# Patient Record
Sex: FEMALE | Race: WHITE | NOT HISPANIC OR LATINO | Employment: OTHER | URBAN - METROPOLITAN AREA
[De-identification: names, ages, dates, MRNs, and addresses within clinical notes are randomized per-mention and may not be internally consistent; named-entity substitution may affect disease eponyms.]

---

## 2017-01-03 ENCOUNTER — THERAPY VISIT (OUTPATIENT)
Dept: PHYSICAL THERAPY | Facility: CLINIC | Age: 82
End: 2017-01-03
Payer: MEDICARE

## 2017-01-03 DIAGNOSIS — G89.29 CHRONIC BILATERAL LOW BACK PAIN WITH LEFT-SIDED SCIATICA: Primary | ICD-10-CM

## 2017-01-03 DIAGNOSIS — M70.62 TROCHANTERIC BURSITIS OF LEFT HIP: ICD-10-CM

## 2017-01-03 DIAGNOSIS — M54.42 CHRONIC BILATERAL LOW BACK PAIN WITH LEFT-SIDED SCIATICA: Primary | ICD-10-CM

## 2017-01-03 DIAGNOSIS — M51.369 DDD (DEGENERATIVE DISC DISEASE), LUMBAR: ICD-10-CM

## 2017-01-03 DIAGNOSIS — M48.061 SPINAL STENOSIS OF LUMBAR REGION WITHOUT NEUROGENIC CLAUDICATION: ICD-10-CM

## 2017-01-03 PROCEDURE — G8978 MOBILITY CURRENT STATUS: HCPCS | Mod: GP | Performed by: PHYSICAL THERAPIST

## 2017-01-03 PROCEDURE — 97110 THERAPEUTIC EXERCISES: CPT | Mod: GP | Performed by: PHYSICAL THERAPIST

## 2017-01-03 PROCEDURE — 97162 PT EVAL MOD COMPLEX 30 MIN: CPT | Mod: GP | Performed by: PHYSICAL THERAPIST

## 2017-01-03 PROCEDURE — 97112 NEUROMUSCULAR REEDUCATION: CPT | Mod: GP | Performed by: PHYSICAL THERAPIST

## 2017-01-03 PROCEDURE — G8979 MOBILITY GOAL STATUS: HCPCS | Mod: GP | Performed by: PHYSICAL THERAPIST

## 2017-01-03 NOTE — LETTER
DEPARTMENT OF HEALTH AND HUMAN SERVICES  CENTERS FOR MEDICARE & MEDICAID SERVICES    PLAN/UPDATED PLAN OF PROGRESS FOR OUTPATIENT REHABILITATION    PATIENTS NAME:  Henry España   : 1929    PROVIDER NUMBER:    [id number]    [facility]  [id number]     PROVIDER NAME: [facility] [facility] - [facility] PHYSICAL [facility]    MEDICAL RECORD NUMBER: [id number]     START OF CARE DATE:  SOC Date: 17   TYPE:  PT    PRIMARY/TREATMENT DIAGNOSIS: (Pertinent Medical Diagnosis)     Chronic bilateral low back pain with left-sided sciatica  Spinal stenosis of lumbar region without neurogenic claudication  DDD (degenerative disc disease), lumbar  Trochanteric bursitis of left hip    VISITS FROM START OF CARE:  Rxs Used: 1     [place] [facility] Initial Evaluation    Subjective:  Henry Albrecht is a 88 year old female with a lumbar condition.  Condition occurred with:  Degenerative joint disease.  Condition occurred: other.  This is a chronic condition  Patient reports that she has had lower back pain for the last 8-10 years. She has had 2 surgeries to the lower back with the most recent in  which did help for a short period of time. She started getting left leg pain not long after that surgery. She also has severe DJD of R knee and they do not want to do a replacement because of her age. She had an injection to the R knee on 16 with no change and injection to the L hip 16 for trochanteric bursitis with no change noted. .    Patient reports pain:  Lumbar spine left, central lumbar spine and lumbar spine right (L>>R).  Radiates to:  Gluteals left, thigh left, lower leg left, knee right and thigh right (lateral left thigh and lower leg, lateral right lateral lower leg).  Pain is described as sharp and aching and is constant and reported as 7/10 (up to 8-9/10 when bad).   Pain is the same all the time.  Symptoms are exacerbated by standing, walking and lifting (stand sage 10'  with 8-9/10 PL, walk sage 5-10' with a walker and 7/10 PL) and relieved by heat and other (sitting, Gabapentin).  Since onset symptoms are unchanged.  Special testing: severe lumbar DDD and DJD with multilevel stenosis and lg synovial cyst L L3-4 which appears to displace L L4 nn root       General health as reported by patient is fair.  Pertinent medical history includes:  Rheumatoid arthritis, cancer, high blood pressure, stroke, kidney disease and thyroid problems (skin cancer, stork in , has only one kidney).  Medical allergies: no.  Other surgeries include:  Orthopedic surgery ( and  to lumbar spine).  Current medications:  High blood pressure medication, thyroid   PATIENTS NAME:  Henry España   : 1929      medication and pain medication.  Current occupation is retired.      Barriers include:  Transportation.  Red flags:  None as reported by the patient.                  Objective:       Lumbar/SI Evaluation   AROM Lumbar:   Flexion:          Fingertips to mid lower leg and no pain  Ext:                    Sign loss and increase pain   Side Bend:        Left:     Right:   Rotation:           Left:     Right:   Side Glide:        Left:  Mod loss and ache    Right:  Mod loss and ache  Lumbar Myotomes:  normal  Lumbar DTR's:  normal  Cord Signs:  normal  Lumbar Dermtomes:  not assessed  Neural Tension/Mobility:  Lumbar:  Normal    Lumbar Palpation:    Tenderness present at Left:    Quadratus Lumborum and Erector Spinae  Tenderness present at Right: Erector Spinae    Magen Lumbar Evaluation  Posture:  Sitting: poor  Standing: fair    Lateral Shift: no  Correction of Posture: no effect    Test Movements:  FIS: During: no effect  After: no effect    Repeat FIS: During: no effect  After: no effect    EIS: During: increases  After: no worse    Repeat EIS: During: increases  After: worse    KARINA: During: no effect  After: no effect    Repeat KARINA: During: decreases  After: better       Assessment/Plan:    Patient is a 88 year old female with lumbar complaints.    Patient has the following significant findings with corresponding treatment plan.                Diagnosis 1:  Lumbar stenosis/sign DDD,   Pain -  hot/cold therapy, electric stimulation, mechanical traction, manual therapy, self management, education, directional preference exercise and home program  Decreased ROM/flexibility - manual therapy, therapeutic exercise and home program  Decreased joint mobility - manual therapy, therapeutic exercise and home program  Decreased strength - therapeutic exercise, therapeutic activities and home program  PATIENTS NAME:  Henry España   : 1929      Impaired balance - neuro re-education, therapeutic activities and home program  Impaired muscle performance - neuro re-education and home program  Decreased function - therapeutic activities and home program  Impaired posture - neuro re-education and home program    Therapy Evaluation Codes:   1) History comprised of:   Personal factors that impact the plan of care:      Age.    Comorbidity factors that impact the plan of care are:      Osteoarthritis.     Medications impacting care: None.  2) Examination of Body Systems comprised of:   Body structures and functions that impact the plan of care:      Hip, Knee and Lumbar spine.   Activity limitations that impact the plan of care are:      Cooking, Driving, Lifting, Standing and Walking.   Clinical presentation characteristics are:    Evolving/Changing.  3) Presentation comprised of:   Presentation scored as Moderate complexity with Evolving presentation with changing characteristics..  4) Decision-Making    Moderate complexity using standardized patient assessment instrument and/or measureable assessment of functional outcome.  Cumulative Therapy Evaluation is: Moderate complexity.    Previous and current functional limitations:  (See Goal Flow Sheet for this information)    Short term  "and Long term goals: (See Goal Flow Sheet for this information)     Communication ability:  Patient appears to be able to clearly communicate and understand verbal and written communication and follow directions correctly.  Treatment Explanation - The following has been discussed with the patient:   RX ordered/plan of care  Anticipated outcomes  Possible risks and side effects  This patient would benefit from PT intervention to resume normal activities.   Rehab potential is good.    Frequency:  2 X week, once daily  Duration:  for 2 weeks and progressing to 1x week for 2 weeks  Discharge Plan:  Achieve all LTG.  Independent in home treatment program.  Reach maximal therapeutic benefit.       PATIENTS NAME:  Henry España   : 1929        Caregiver Signature/Credentials _____________________________ Date ________       Treating Provider: Joanna Jacobsen PT   I have reviewed and certified the need for these services and plan of treatment while under my care.        PHYSICIAN'S SIGNATURE:   _________________________________________  Date___________   Adolph Colon    Certification period:  Beginning of Cert date period: 17 to  End of Cert period date: 17     Functional Level Progress Report: Please see attached \"Goal Flow sheet for Functional level.\"    ____X____ Continue Services or       ________ DC Services                Service dates: From  SOC Date: 17 date to present                         "

## 2017-01-03 NOTE — PROGRESS NOTES
[place] [facility] Initial Evaluation  Subjective:    Henry Albrecht is a 88 year old female with a lumbar condition.  Condition occurred with:  Degenerative joint disease.  Condition occurred: other.  This is a chronic condition  Patient reports that she has had lower back pain for the last 8-10 years. She has had 2 surgeries to the lower back with the most recent in 2015 which did help for a short period of time. She started getting left leg pain not long after that surgery. She also has severe DJD of R knee and they do not want to do a replacement because of her age. She had an injection to the R knee on 12-20-16 with no change and injection to the L hip 12-21-16 for trochanteric bursitis with no change noted. .    Patient reports pain:  Lumbar spine left, central lumbar spine and lumbar spine right (L>>R).  Radiates to:  Gluteals left, thigh left, lower leg left, knee right and thigh right (lateral left thigh and lower leg, lateral right lateral lower leg).  Pain is described as sharp and aching and is constant and reported as 7/10 (up to 8-9/10 when bad).   Pain is the same all the time.  Symptoms are exacerbated by standing, walking and lifting (stand sage 10' with 8-9/10 PL, walk sage 5-10' with a walker and 7/10 PL) and relieved by heat and other (sitting, Gabapentin).  Since onset symptoms are unchanged.  Special testing: severe lumbar DDD and DJD with multilevel stenosis and lg synovial cyst L L3-4 which appears to displace L L4 nn root       General health as reported by patient is fair.  Pertinent medical history includes:  Rheumatoid arthritis, cancer, high blood pressure, stroke, kidney disease and thyroid problems (skin cancer, stork in 1998, has only one kidney).  Medical allergies: no.  Other surgeries include:  Orthopedic surgery (2011 and 2015 to lumbar spine).  Current medications:  High blood pressure medication, thyroid medication and pain medication.  Current occupation is  retired.        Barriers include:  Transportation.    Red flags:  None as reported by the patient.                      Objective:    System         Lumbar/SI Evaluation  ROM:    AROM Lumbar:   Flexion:          Fingertips to mid lower leg and no pain  Ext:                    Sign loss and increase pain   Side Bend:        Left:     Right:   Rotation:           Left:     Right:   Side Glide:        Left:  Mod loss and ache    Right:  Mod loss and ache          Lumbar Myotomes:  normal            Lumbar DTR's:  normal      Cord Signs:  normal    Lumbar Dermtomes:  not assessed                Neural Tension/Mobility:  Lumbar:  Normal        Lumbar Palpation:    Tenderness present at Left:    Quadratus Lumborum and Erector Spinae  Tenderness present at Right: Erector Spinae                                                       Magen Lumbar Evaluation    Posture:  Sitting: poor  Standing: fair    Lateral Shift: no  Correction of Posture: no effect      Test Movements:  FIS: During: no effect  After: no effect    Repeat FIS: During: no effect  After: no effect    EIS: During: increases  After: no worse    Repeat EIS: During: increases  After: worse    KARINA: During: no effect  After: no effect    Repeat KARINA: During: decreases  After: better                                                     ROS    Assessment/Plan:      Patient is a 88 year old female with lumbar complaints.    Patient has the following significant findings with corresponding treatment plan.                Diagnosis 1:  Lumbar stenosis/sign DDD,   Pain -  hot/cold therapy, electric stimulation, mechanical traction, manual therapy, self management, education, directional preference exercise and home program  Decreased ROM/flexibility - manual therapy, therapeutic exercise and home program  Decreased joint mobility - manual therapy, therapeutic exercise and home program  Decreased strength - therapeutic exercise, therapeutic activities and home  program  Impaired balance - neuro re-education, therapeutic activities and home program  Impaired muscle performance - neuro re-education and home program  Decreased function - therapeutic activities and home program  Impaired posture - neuro re-education and home program    Therapy Evaluation Codes:   1) History comprised of:   Personal factors that impact the plan of care:      Age.    Comorbidity factors that impact the plan of care are:      Osteoarthritis.     Medications impacting care: None.  2) Examination of Body Systems comprised of:   Body structures and functions that impact the plan of care:      Hip, Knee and Lumbar spine.   Activity limitations that impact the plan of care are:      Cooking, Driving, Lifting, Standing and Walking.   Clinical presentation characteristics are:    Evolving/Changing.  3) Presentation comprised of:   Presentation scored as Moderate complexity with Evolving presentation with changing characteristics..  4) Decision-Making    Moderate complexity using standardized patient assessment instrument and/or measureable assessment of functional outcome.  Cumulative Therapy Evaluation is: Moderate complexity.    Previous and current functional limitations:  (See Goal Flow Sheet for this information)    Short term and Long term goals: (See Goal Flow Sheet for this information)     Communication ability:  Patient appears to be able to clearly communicate and understand verbal and written communication and follow directions correctly.  Treatment Explanation - The following has been discussed with the patient:   RX ordered/plan of care  Anticipated outcomes  Possible risks and side effects  This patient would benefit from PT intervention to resume normal activities.   Rehab potential is good.    Frequency:  2 X week, once daily  Duration:  for 2 weeks and progressing to 1x week for 2 weeks  Discharge Plan:  Achieve all LTG.  Independent in home treatment program.  Reach maximal  therapeutic benefit.    Please refer to the daily flowsheet for treatment today, total treatment time and time spent performing 1:1 timed codes.

## 2017-01-05 ENCOUNTER — THERAPY VISIT (OUTPATIENT)
Dept: PHYSICAL THERAPY | Facility: CLINIC | Age: 82
End: 2017-01-05
Payer: MEDICARE

## 2017-01-05 DIAGNOSIS — M54.42 CHRONIC BILATERAL LOW BACK PAIN WITH LEFT-SIDED SCIATICA: Primary | ICD-10-CM

## 2017-01-05 DIAGNOSIS — M70.62 TROCHANTERIC BURSITIS OF LEFT HIP: ICD-10-CM

## 2017-01-05 DIAGNOSIS — G89.29 CHRONIC BILATERAL LOW BACK PAIN WITH LEFT-SIDED SCIATICA: Primary | ICD-10-CM

## 2017-01-05 PROCEDURE — 97110 THERAPEUTIC EXERCISES: CPT | Mod: GP | Performed by: PHYSICAL THERAPIST

## 2017-01-05 PROCEDURE — 97112 NEUROMUSCULAR REEDUCATION: CPT | Mod: GP | Performed by: PHYSICAL THERAPIST

## 2017-01-10 ENCOUNTER — THERAPY VISIT (OUTPATIENT)
Dept: PHYSICAL THERAPY | Facility: CLINIC | Age: 82
End: 2017-01-10
Payer: MEDICARE

## 2017-01-10 DIAGNOSIS — G89.29 CHRONIC BILATERAL LOW BACK PAIN WITH LEFT-SIDED SCIATICA: ICD-10-CM

## 2017-01-10 DIAGNOSIS — M54.42 CHRONIC BILATERAL LOW BACK PAIN WITH LEFT-SIDED SCIATICA: ICD-10-CM

## 2017-01-10 DIAGNOSIS — M70.62 TROCHANTERIC BURSITIS OF LEFT HIP: Primary | ICD-10-CM

## 2017-01-10 PROCEDURE — 97112 NEUROMUSCULAR REEDUCATION: CPT | Mod: GP | Performed by: PHYSICAL THERAPIST

## 2017-01-10 PROCEDURE — 97110 THERAPEUTIC EXERCISES: CPT | Mod: GP | Performed by: PHYSICAL THERAPIST

## 2017-01-10 NOTE — PROGRESS NOTES
Subjective:    HPI                    Objective:    System    Physical Exam    General     ROS    Assessment/Plan:      SUBJECTIVE  Subjective changes as noted by pt:  Patient reports that she is feeling some better. She still gets left leg pain with standing and walking. The sitting flexion stretch does help decrease her pain.      Current Pain level: 3/10   Changes in function:  Yes (See Goal flowsheet attached for changes in current functional level)     Adverse reaction to treatment or activity:  None    OBJECTIVE  Changes in objective findings:  Pain produced yet with BB and improved with FB. Patient becoming more independent with HEP and endurance improved as well.         ASSESSMENT  Henry continues to require intervention to meet STG and LTG's: PT  Patient is progressing as expected.  Response to therapy has shown an improvement in  pain level and function  Progress made towards STG/LTG?  Yes (See Goal flowsheet attached for updates  achievement of STG and LTG)    PLAN  Current treatment program is being advanced to more complex exercises.    PTA/ATC plan:  N/A    Please refer to the daily flowsheet for treatment today, total treatment time and time spent performing 1:1 timed codes.

## 2017-01-12 ENCOUNTER — THERAPY VISIT (OUTPATIENT)
Dept: PHYSICAL THERAPY | Facility: CLINIC | Age: 82
End: 2017-01-12
Payer: MEDICARE

## 2017-01-12 DIAGNOSIS — M70.62 TROCHANTERIC BURSITIS OF LEFT HIP: Primary | ICD-10-CM

## 2017-01-12 DIAGNOSIS — M54.42 CHRONIC BILATERAL LOW BACK PAIN WITH LEFT-SIDED SCIATICA: ICD-10-CM

## 2017-01-12 DIAGNOSIS — G89.29 CHRONIC BILATERAL LOW BACK PAIN WITH LEFT-SIDED SCIATICA: ICD-10-CM

## 2017-01-12 PROCEDURE — 97110 THERAPEUTIC EXERCISES: CPT | Mod: GP | Performed by: PHYSICAL THERAPIST

## 2017-01-12 PROCEDURE — 97140 MANUAL THERAPY 1/> REGIONS: CPT | Mod: GP | Performed by: PHYSICAL THERAPIST

## 2017-01-17 ENCOUNTER — THERAPY VISIT (OUTPATIENT)
Dept: PHYSICAL THERAPY | Facility: CLINIC | Age: 82
End: 2017-01-17
Payer: MEDICARE

## 2017-01-17 DIAGNOSIS — G89.29 CHRONIC BILATERAL LOW BACK PAIN WITH LEFT-SIDED SCIATICA: ICD-10-CM

## 2017-01-17 DIAGNOSIS — M54.42 CHRONIC BILATERAL LOW BACK PAIN WITH LEFT-SIDED SCIATICA: ICD-10-CM

## 2017-01-17 DIAGNOSIS — M70.62 TROCHANTERIC BURSITIS OF LEFT HIP: Primary | ICD-10-CM

## 2017-01-17 PROCEDURE — 97035 APP MDLTY 1+ULTRASOUND EA 15: CPT | Mod: GP | Performed by: PHYSICAL THERAPIST

## 2017-01-17 PROCEDURE — 97110 THERAPEUTIC EXERCISES: CPT | Mod: GP | Performed by: PHYSICAL THERAPIST

## 2017-01-17 NOTE — PROGRESS NOTES
Subjective:    HPI                    Objective:    System    Physical Exam    General     ROS    Assessment/Plan:      SUBJECTIVE  Subjective changes as noted by pt:  Patient reports that she is about 40-50% better since starting therapy. She had a bad day yesterday, but she thinks it was from the weather. She overall, notes less leg pain in the morning and less pain overall.    Current Pain level: 3/10   Changes in function:  Yes (See Goal flowsheet attached for changes in current functional level)     Adverse reaction to treatment or activity:  None    OBJECTIVE  Changes in objective findings:  Standing LROM: extension still significantly limited with onset of left leg pain. She is now able to perform HEP with minimal verbal cues needed.         ASSESSMENT  Henry continues to require intervention to meet STG and LTG's: PT  Patient's symptoms are resolving.  Patient is progressing as expected.  Response to therapy has shown an improvement in  pain level, radicular symptoms and function  Progress made towards STG/LTG?  Yes (See Goal flowsheet attached for updates on achievement of STG and LTG)    PLAN  Continue current treatment plan until patient demonstrates readiness to progress to higher level exercises.  The following modalities have been added:  ultrasound    PTA/ATC plan:  N/A    Please refer to the daily flowsheet for treatment today, total treatment time and time spent performing 1:1 timed codes.

## 2017-01-24 ENCOUNTER — THERAPY VISIT (OUTPATIENT)
Dept: PHYSICAL THERAPY | Facility: CLINIC | Age: 82
End: 2017-01-24
Payer: MEDICARE

## 2017-01-24 DIAGNOSIS — G89.29 CHRONIC BILATERAL LOW BACK PAIN WITH LEFT-SIDED SCIATICA: ICD-10-CM

## 2017-01-24 DIAGNOSIS — M70.62 TROCHANTERIC BURSITIS OF LEFT HIP: Primary | ICD-10-CM

## 2017-01-24 DIAGNOSIS — M54.42 CHRONIC BILATERAL LOW BACK PAIN WITH LEFT-SIDED SCIATICA: ICD-10-CM

## 2017-01-24 PROCEDURE — 97110 THERAPEUTIC EXERCISES: CPT | Mod: GP | Performed by: PHYSICAL THERAPIST

## 2017-01-24 PROCEDURE — G8980 MOBILITY D/C STATUS: HCPCS | Mod: GP | Performed by: PHYSICAL THERAPIST

## 2017-01-24 PROCEDURE — G8978 MOBILITY CURRENT STATUS: HCPCS | Mod: GP | Performed by: PHYSICAL THERAPIST

## 2017-01-24 PROCEDURE — G8979 MOBILITY GOAL STATUS: HCPCS | Mod: GP | Performed by: PHYSICAL THERAPIST

## 2017-01-24 PROCEDURE — 97112 NEUROMUSCULAR REEDUCATION: CPT | Mod: GP | Performed by: PHYSICAL THERAPIST

## 2017-01-24 NOTE — PROGRESS NOTES
Subjective:    HPI                    Objective:    System    Physical Exam    General     ROS    Assessment/Plan:      DISCHARGE REPORT    Progress reporting period is from 1-3-17 to 1-24-17.       SUBJECTIVE  Subjective changes noted by patient:  Patient reports that she feels moderately better since starting therapy. She has been having less left leg pain. She is less painful when she first gets up in the morning. She finds the exercises are helping her as well. She still can't stand for too long a period of time.       Current Pain level: 3/10.     Previous pain level was  7/10  .   Changes in function:  Yes (See Goal flowsheet attached for changes in current functional level)  Adverse reaction to treatment or activity: None    OBJECTIVE  Changes noted in objective findings:  Standing LROM: FB with fingertips to toes and no pain and no pain with RFIS and BB mod to sign loss and increase ahce noted. Neuro remains grossly intact.         ASSESSMENT/PLAN  Updated problem list and treatment plan: Diagnosis 1:  Severe lumbar DDD  Pain -  manual therapy, self management, education, directional preference exercise and home program  Decreased ROM/flexibility - manual therapy, therapeutic exercise and home program  Decreased joint mobility - manual therapy, therapeutic exercise and home program  Decreased function - therapeutic activities and home program  Impaired posture - neuro re-education and home program  STG/LTGs have been met or progress has been made towards goals:  Yes (See Goal flow sheet completed today.)  Assessment of Progress: The patient's condition is improving.  Patient is meeting short term goals and is progressing towards long term goals.  Self Management Plans:  Patient is independent in a home treatment program.  Patient is independent in self management of symptoms.    Henry continues to require the following intervention to meet STG and LTG's:  PT intervention is no longer required to meet  STG/LTG.    Recommendations:  This patient is ready to be discharged from therapy and continue their home treatment program.    Please refer to the daily flowsheet for treatment today, total treatment time and time spent performing 1:1 timed codes.

## 2017-01-27 PROBLEM — G89.29 CHRONIC BILATERAL LOW BACK PAIN WITH LEFT-SIDED SCIATICA: Status: RESOLVED | Noted: 2017-01-03 | Resolved: 2017-01-27

## 2017-01-27 PROBLEM — M54.42 CHRONIC BILATERAL LOW BACK PAIN WITH LEFT-SIDED SCIATICA: Status: RESOLVED | Noted: 2017-01-03 | Resolved: 2017-01-27

## 2017-01-27 PROBLEM — M70.62 TROCHANTERIC BURSITIS OF LEFT HIP: Status: RESOLVED | Noted: 2017-01-05 | Resolved: 2017-01-27

## 2021-02-26 ENCOUNTER — IMMUNIZATION (OUTPATIENT)
Dept: PEDIATRICS | Facility: CLINIC | Age: 86
End: 2021-02-26
Payer: COMMERCIAL

## 2021-02-26 PROCEDURE — 91300 PR COVID VAC PFIZER DIL RECON 30 MCG/0.3 ML IM: CPT

## 2021-02-26 PROCEDURE — 0001A PR COVID VAC PFIZER DIL RECON 30 MCG/0.3 ML IM: CPT

## 2021-03-19 ENCOUNTER — IMMUNIZATION (OUTPATIENT)
Dept: PEDIATRICS | Facility: CLINIC | Age: 86
End: 2021-03-19
Attending: INTERNAL MEDICINE
Payer: COMMERCIAL

## 2021-03-19 PROCEDURE — 0002A PR COVID VAC PFIZER DIL RECON 30 MCG/0.3 ML IM: CPT

## 2021-03-19 PROCEDURE — 91300 PR COVID VAC PFIZER DIL RECON 30 MCG/0.3 ML IM: CPT

## 2021-12-21 ENCOUNTER — TRANSCRIBE ORDERS (OUTPATIENT)
Dept: OTHER | Age: 86
End: 2021-12-21

## 2021-12-21 DIAGNOSIS — M54.41 RIGHT-SIDED LOW BACK PAIN WITH RIGHT-SIDED SCIATICA, UNSPECIFIED CHRONICITY: Primary | ICD-10-CM

## 2022-01-08 ENCOUNTER — THERAPY VISIT (OUTPATIENT)
Dept: PHYSICAL THERAPY | Facility: CLINIC | Age: 87
End: 2022-01-08
Attending: INTERNAL MEDICINE
Payer: COMMERCIAL

## 2022-01-08 DIAGNOSIS — M54.41 RIGHT-SIDED LOW BACK PAIN WITH RIGHT-SIDED SCIATICA, UNSPECIFIED CHRONICITY: Primary | ICD-10-CM

## 2022-01-08 DIAGNOSIS — M54.41 ACUTE RIGHT-SIDED LOW BACK PAIN WITH RIGHT-SIDED SCIATICA: ICD-10-CM

## 2022-01-08 PROCEDURE — 97161 PT EVAL LOW COMPLEX 20 MIN: CPT | Mod: GP | Performed by: PHYSICAL THERAPIST

## 2022-01-08 PROCEDURE — 97110 THERAPEUTIC EXERCISES: CPT | Mod: GP | Performed by: PHYSICAL THERAPIST

## 2022-01-08 PROCEDURE — 97530 THERAPEUTIC ACTIVITIES: CPT | Mod: GP | Performed by: PHYSICAL THERAPIST

## 2022-01-08 NOTE — PROGRESS NOTES
[facility] Initial Evaluation -- Lumbar    Date: January 8, 2022  Henry Albrecht is a 89 year old female with a lumbar condition.   Referral: Dr. Bryan Barrientos mechanical stresses:    Employment status:  retired  Leisure mechanical stresses: walking  Functional disability score (DEBORAH/STarT Back):  44.44/ Temi STarT High (7)  VAS score (0-10): 7/10  Patient goals/expectations:  Alleviate back pain so that all activity is easier to do    HISTORY:    Present symptoms: symptoms are felt across her lower back and into the latera R LE, usually to the knee but has had some symptoms to the ankle   Pain quality (sharp/shooting/stabbing/aching/burning/cramping):  Ache, intermittent sharp    Paresthesia (yes/no):  no    Present since (onset date): end/mid November 2022.     Symptoms (improving/unchanging/worsening):  unchanging.     Symptoms commenced as a result of: unknown   Condition occurred in the following environment:   home     Symptoms at onset (back/thigh/leg): back and R LE  Constant symptoms (back/thigh/leg): back and R LE  Intermittent symptoms (back/thigh/leg): sharp pain     Symptoms are made worse with the following: Sometimes Sitting, Always Rising, Always Standing, Sometimes Walking, Time of day - Sometimes as the day progresses, Sometimes When still, Sometimes On the move and Other - Lifting   Symptoms are made better with the following: Always Lying, Time of day - Sometimes AM, Sometimes When still and Sometimes On the move.  Notes when symptoms are bad, she will get up and walk.  May talk about 5 minutes and symptoms will improve.    Disturbed sleep (yes/no):  no   Sleeping postures (prone/sup/side R/L): side lying    Previous history: back and L LE symptoms  Previous treatments: back surgery, PT in the past when symptoms on the L side.        Specific Questions:  Cough/Sneeze/Strain (pos/neg): neg  Bowel/Bladder (normal/abnormal): no change since lower back pain worsened  Gait  (normal/abnormal): uses a standard walker with front wheels  Medications (nil/NSAIDS/analg/steroids/anticoag/other):  NSAIDS and Other - Gabapentin, Plavix, Levothyroxin, lisinopril  Medical allergies:  no  General health (excellent/good/fair/poor):  fair  Pertinent medical history:  High blood pressure, Kidney disease, Osteoporosis, Stroke and Thyroid problems  Imaging (None/Xray/MRI/Other):    Recent or major surgery (yes/no):  2 previous back surgeries,  L2-3 and L3-4 lumbar decompression, 2015 Lumbar laminectomy (Bilateral) Procedure: L2-S1 LAMINECTOMY AND BILATERAL FORAMINOTOMIES;   Bladder surgery   Night pain (yes/no): no  Accidents (yes/no): no  Unexplained weight loss (yes/no): no  Barriers at home: no  Other red flags: no    EXAMINATION    Posture:   Sitting (good/fair/poor): poor  Standing (good/fair/poor):poor  Lordosis (red/acc/normal): reduced  Correction of posture (better/worse/no effect): worse - LB and R LE    Lateral Shift (right/left/nil): nil  Relevant (yes/no):  no  Other Observations: uses a walker that is raised high to assist her to stand more erect, but she tends to keep the walker ahead thus causing her to be in more flexion.     Neurological:    Motor deficit:  Good strength for B LEs  Reflexes:  Symmetrical patella and ankle jerk  Sensory deficit: not tested   Dural signs:  negative    Movement Loss:   Matthias Mod Min Nil Pain   Flexion  x   Low back pain   Extension x x   Inc R LE   Side Gliding R  x   Low back pain   Side Gliding L  x   Low back pain     Test Movements:   During: produces, abolishes, increases, decreases, no effect, centralizing, peripheralizing   After: better, worse, no better, no worse, no effect, centralized, peripheralized    Pretest symptoms standing: not tested   Symptoms During Symptoms After ROM increased ROM decreased No Effect   FIS          Rep FIS          EIS          Rep EIS            Pretest symptoms lyin/10 low back, R lat hip    Symptoms  During Symptoms After ROM increased ROM decreased No Effect   KARINA Increases    No Worse         Rep KARINA Increases    No Worse  Does bother her R knee bot thinks this is due to DJD    Improved standing posture     EIL        Rep EIL          If required, pretest symptoms: not assessed   Symptoms During Symptoms After ROM increased ROM decreased No Effect   SGIS - R        Rep SGIS - R        SGIS - L        Rep SGIS - L          Static Tests:  Sitting slouched:  Not tested   Sitting erect:  Not tested  Standing slouched: not tested  Standing erect:  Not tested  Lying prone in extension:  Not tested Long sitting:  Not tested    Other Tests: Flexion in sitting (FISit) - decreased back pain, B; inc EIS ROM without shooting pain into the R LE, improved SGIS without LBP    Provisional Classification:  Inconclusive/Other - Spinal Stenosis    Principle of Management:  Education:  Discussed assessing flexion as this movement did help with her lower back pain and improved her motion without increased pain in the back or LE.  Discussed this exercise opens the joint spaces in her back to decrease pressure on the nerves.  We discussed centralization and peripheralization.  Pt to discontinue the exercise if LE symptoms worsen    Equipment provided:  none  Mechanical therapy (Y/N):  yes   Extension principle:    Lateral Principle:    Flexion principle:  FISit 10 reps, 4-6 times a day  Other:      ASSESSMENT/PLAN:    Patient is a 89 year old female with lumbar complaints.    Patient has the following significant findings with corresponding treatment plan.                Diagnosis 1:  Lumbar radiculopathy/degenerative changes    Pain -  manual therapy, self management, education, directional preference exercise and home program  Decreased ROM/flexibility - manual therapy, therapeutic exercise and home program  Decreased joint mobility - manual therapy, therapeutic exercise and home program  Decreased function - therapeutic  activities and home program  Impaired posture - neuro re-education, therapeutic activities and home program    Therapy Evaluation Codes:   1) History comprised of:   Personal factors that impact the plan of care:      Age.    Comorbidity factors that impact the plan of care are:      High blood pressure, Osteoarthritis, Pain at night/rest and Stroke.     Medications impacting care: High blood pressure and gabapentin, thyroid.  2) Examination of Body Systems comprised of:   Body structures and functions that impact the plan of care:      Lumbar spine.   Activity limitations that impact the plan of care are:      Lifting, Standing and Walking.  3) Clinical presentation characteristics are:   Stable/Uncomplicated.  4) Decision-Making    High complexity using standardized patient assessment instrument and/or measureable assessment of functional outcome.  Cumulative Therapy Evaluation is: Low complexity.    Previous and current functional limitations:  (See Goal Flow Sheet for this information)    Short term and Long term goals: (See Goal Flow Sheet for this information)     Communication ability:  Patient appears to be able to clearly communicate and understand verbal and written communication and follow directions correctly.  Treatment Explanation - The following has been discussed with the patient:   RX ordered/plan of care  Anticipated outcomes  Possible risks and side effects  This patient would benefit from PT intervention to resume normal activities.   Rehab potential is good.    Frequency:  1 X week, once daily  Duration:  for 4 weeks tapering to every other week over 4 weeks  Discharge Plan:  Achieve all LTG.  Independent in home treatment program.  Reach maximal therapeutic benefit.    Please refer to the daily flowsheet for treatment today, total treatment time and time spent performing 1:1 timed codes.

## 2022-01-09 NOTE — PROGRESS NOTES
[facility]    OUTPATIENT Physical Therapy ORTHOPEDIC EVALUATION  PLAN OF TREATMENT FOR OUTPATIENT REHABILITATION  (COMPLETE FOR INITIAL CLAIMS ONLY)  Patient's Last Name, First Name, M.I.  YOB: 1929  Henry España    Provider s Name:  [facility]   Medical Record No.  [id number]   Start of Care Date:  01/08/22   Onset Date:   12/08/22 (MD appointment; symptoms started in November)   Type:     _X__PT   ___OT Medical Diagnosis:    Encounter Diagnoses   Name Primary?     Right-sided low back pain with right-sided sciatica, unspecified chronicity Yes     Acute right-sided low back pain with right-sided sciatica         Treatment Diagnosis:  lumbar radiculopathy        Goals:     01/08/22 0500   Body Part   Goals listed below are for low back   Goal #1   Goal #1 standing   Previous Functional Level No restrictions   Current Functional Level Minutes patient can stand   Performance level 5 minutes before increased symptoms and wants to sit down.     STG Target Performance Minutes patient will be able to stand   Performance level 15-20 minutes without back or LE symptoms   Rationale for housekeeping tasks such as vacuuming, bed making, mowing, gardening;for personal hygiene;for meal preparation   Due date 01/29/22   LTG Target Performance Minutes patient will be able to stand   Performance Level 30 minutes without increased back or LE symptoms   Rationale for housekeeping tasks such as vacuuming, bed making, mowing;for meal preparation;for safe household ambulation;for safe community ambulation   Due date 03/09/22         Therapy Frequency:  1 time a week for 4 weeks then decrease to every other week for 4 weeks  Predicted Duration of Therapy Intervention:  8 weeks    Rika Galarza PT                 I CERTIFY THE NEED FOR THESE SERVICES FURNISHED UNDER         THIS PLAN OF TREATMENT AND WHILE UNDER MY CARE .             Physician Signature               Date    X_____________________________________________________                             Certification Date From:  01/08/22   Certification Date To:  03/09/22    Referring Provider:  Jessica Adams    Initial Assessment        See Epic Evaluation SOC Date: 01/08/22

## 2022-01-14 ENCOUNTER — THERAPY VISIT (OUTPATIENT)
Dept: PHYSICAL THERAPY | Facility: CLINIC | Age: 87
End: 2022-01-14
Payer: COMMERCIAL

## 2022-01-14 DIAGNOSIS — M54.41 ACUTE RIGHT-SIDED LOW BACK PAIN WITH RIGHT-SIDED SCIATICA: ICD-10-CM

## 2022-01-14 PROCEDURE — 97110 THERAPEUTIC EXERCISES: CPT | Mod: GP | Performed by: PHYSICAL THERAPIST

## 2022-01-31 ENCOUNTER — THERAPY VISIT (OUTPATIENT)
Dept: PHYSICAL THERAPY | Facility: CLINIC | Age: 87
End: 2022-01-31
Payer: COMMERCIAL

## 2022-01-31 DIAGNOSIS — M25.511 ACUTE PAIN OF BOTH SHOULDERS: ICD-10-CM

## 2022-01-31 DIAGNOSIS — M54.2 NECK PAIN: ICD-10-CM

## 2022-01-31 DIAGNOSIS — M25.512 ACUTE PAIN OF BOTH SHOULDERS: ICD-10-CM

## 2022-01-31 PROCEDURE — 97530 THERAPEUTIC ACTIVITIES: CPT | Mod: GP | Performed by: PHYSICAL THERAPIST

## 2022-01-31 PROCEDURE — 97161 PT EVAL LOW COMPLEX 20 MIN: CPT | Mod: GP | Performed by: PHYSICAL THERAPIST

## 2022-01-31 PROCEDURE — 97110 THERAPEUTIC EXERCISES: CPT | Mod: GP | Performed by: PHYSICAL THERAPIST

## 2022-01-31 NOTE — PROGRESS NOTES
[facility]    OUTPATIENT Physical Therapy ORTHOPEDIC EVALUATION  PLAN OF TREATMENT FOR OUTPATIENT REHABILITATION  (COMPLETE FOR INITIAL CLAIMS ONLY)  Patient's Last Name, First Name, M.I.  YOB: 1929  Henry España    Provider s Name:  [facility]   Medical Record No.  [id number]   Start of Care Date:  01/31/22   Onset Date:   01/24/22   Type:     _X__PT   ___OT Medical Diagnosis:    Encounter Diagnoses   Name Primary?     Neck pain      Acute pain of both shoulders         Treatment Diagnosis:           Goals:     01/31/22 0500   Body Part   Goals listed below are for neck, R shoulder pain   Goal #2   Goal #2 sleeping   Previous Functional Level No restrictions   Performance Level was able to sleep on her sides without neck pain    Current Functional Level 3-5 hours without sleep per night   Performance Level did not sleep at all last night and tried a semi reclining position   STG Target Performance 1-2 hours without sleep per night   Performance Level in side lying, either side   Rationale to establish restorative sleep pattern   Due Date 03/02/22   LTG Target Performance Sleep through the night w/o meds   Performance level either side   Rationale to establish restorative sleep pattern   Due Date 04/01/22       Therapy Frequency:  1 time a week   Predicted Duration of Therapy Intervention:  8    Rika Galarza, PT                 I CERTIFY THE NEED FOR THESE SERVICES FURNISHED UNDER        THIS PLAN OF TREATMENT AND WHILE UNDER MY CARE .             Physician Signature               Date    X_____________________________________________________                             Certification Date From:  01/31/22   Certification Date To:  04/01/22    Referring Provider:  Jessica Adams    Initial Assessment        See Epic Evaluation SOC Date: 01/31/22

## 2022-01-31 NOTE — PROGRESS NOTES
[facility] Initial Evaluation -- Cervical    Evaluation Date: January 31, 2022  Henry Albrecht is a 89 year old female with a neck, R shoulder condition.   Referral: Dr. Bryan Barrientos mechanical stresses:    Employment status: retired  Leisure mechanical stresses: walking  Functional disability score (NDI):  46.67, SPADI 76.15  VAS score (0-10): 6-7/10 neck and shoulder  Patient goals/expectations:  Alleviate pain at rest and with movement of the neck, to be able to sleep on her sides without waking due to pain.  Use her arms with more ease    HISTORY:    Present symptoms:  Neck pain with symptoms into the R  UT/periscapular region, same symptoms on the L but not as bad.  Pain quality (sharp/shooting/stabbing/aching/burning/cramping):      Soreness, sharp.  Paresthesia (yes/no):  L > R hands    Present since (onset date):1/17/2022     Symptoms (improving/unchanging/worsening):  Worsening    Symptoms commenced as a result of: unknown   Condition occurred in the following environment:  home    Symptoms at onset (neck/arm/forearm/headache): neck, UT, periscap areas  Constant symptoms (neck/arm/forearm/headache): neck UT, periscap  Intermittent symptoms (neck/arm/forearm/headache):     Symptoms are made worse with the following: Sometimes Bending, Sometimes Sitting, Sometimes Lying, Always Rising, time of day - Always as the day progresses, Sometimes On the move   Symptoms are made better with the following: Always When still and avoiding the painful movements.  heat feels good but does not remain better, massage    Disturbed sleep (yes/no): yes - did not sleep at all last evening  Number of pillows: none   Sleeping postures (prone/sup/side R/L): side sleeper (has been sleeping propped up/reclining)    Previous episodes (0/1-5/6-10/11+): 0 Year of first episode: 2022    Previous history: none  Previous treatments: none    Specific Questions: (as reported by the  patient)  Dizziness/Tinnitus/Nausea/Swallowing (pos/neg): negative  Gait/Upper Limbs (normal/abnormal): weakness of the R hand for the past few days  Medications (nil/NSAIDS/anlag/steroids/anticoag/other):  NSAIDS and Other - Gabapentin, Plavix, Levothyroxin, lisinopril  Medical allergies:  Asprin  General health (excellent/good/fair/poor):  Good/fair  Pertinent medical history:  High blood pressure, Kidney disease, Osteoporosis, Stroke and Thyroid problems  Imaging (None/Xray/MRI/Other):  none  Recent or major surgery (yes/no): 2 previous back surgeries, 2011 L2-3 and L3-4 lumbar decompression, 8/6/2015 Lumbar laminectomy (Bilateral) Procedure: L2-S1 LAMINECTOMY AND BILATERAL FORAMINOTOMIES;   Bladder surgery 2011  Night pain (yes/no): waking due to neck pain  Accidents (yes/no): no  Unexplained weight loss (yes/no): no  Barriers at home: no  Other red flags: no    EXAMINATION    Posture:   Sitting (good/fair/poor): poor  Standing (good/fair/poor): poor     Protruded head (yes/no): yes    Wry Neck (right/left/nil):  no  Relevant (yes/no):  no     Correction of posture(better/worse/no effect): no effect for symptoms but ROM improved  Other observations:  Increased thoracic kyphosis, ambulates with wheeled walker (front wheels only), skis on back legs    Neurological:    Motor Deficit:  Dec R thumb extension   Reflexes:  Brisk and symmetrical UE   Sensory Deficit:  Symmetrical to light touch   Dural signs:  Stretch on the R with ULTT in sitting - did not fully extend elbow to feel the symptoms.      Movement Loss:   Matthias Mod Min Nil Pain   Protrusion    x Neck pain   Flexion    x Pull on th e R   Retraction  x   Neck pain on R   Extension  55   Pain on the R   Lateral flexion R  29   Pain on the Rt   Lateral flexion L  29   Pain on the R    Rotation R  50   Pain on the R    Rotation L  55   Pain on the R     Test Movements:   During: produces, abolishes, increases, decreases, no effect, centralizing,  peripheralizing  After: better, worse, no better, no worse, no effect, centralized, peripheralized    Pretest symptoms sittin-/10 pain R side of neck into the upper trap, periscapular area   Symptoms During Symptoms After ROM increased ROM decreased No Effect   PRO          Rep PRO          RET Increases    No Worse         Rep RET Increases    No Worse    Increased B rotation and inc strength R thumb extension      RET EXT          Rep RET EXT            Pretest symptoms lying:  Not assessed   Symptoms During Symptoms After ROM increased ROM decreased No Effect   RET          Rep RET          RET EXT          Rep RET EXT            If required, pretest symptoms sitting:  Not assessed    Symptoms During Symptoms After ROM increased ROM decreased No Effect   LF-R          Rep LF-R          LF-L          Rep LF-L          ROT-R          Rep ROT-R          ROT-L          Rep ROT-L          FLEX          Rep FLEX              Static Tests:   Protrusion:  Not assessed  Flexion:  Not assessed  Retraction:  Not assessed  Extension (sitting/prone/supine):  Not assessed    Other Tests: increased muscle tone for the neck and B UT.  Gentle massage to the area does feel good    Provisional Classification:  Derangement - Asymmetrical, unilateral, symptoms below elbow    Principle of Management:  Education:  Discussed proper sitting and sleeping posture (must use a pillow when in side-lying). Patient to work on avoiding prolonged looking down.   Patient is to monitor her strength for the R UE while eating to determine if her strength is altered by her exercises.    Equipment provided:  None - did demonstrate how to roll a towel to use to support her thoracic spine and how to support her neck for sleeping.    Mechanical therapy (Y/N):  yes   Extension principle:  Repeated retraction 10 reps, 6-8 times a day.   Lateral principle:    Flexion principle:     Other:      ASSESSMENT/PLAN:    Patient is a 89 year old female with  cervical and R shoulder complaints (symptoms are bilateral but much worse on the Rt).    Patient has the following significant findings with corresponding treatment plan.                Diagnosis 1:  Neck and shoulder pain    Pain -  manual therapy, self management, education, directional preference exercise and home program  Decreased ROM/flexibility - manual therapy, therapeutic exercise and home program  Decreased joint mobility - manual therapy, therapeutic exercise and home program  Decreased strength - therapeutic exercise, therapeutic activities and home program  Decreased function - therapeutic activities and home program  Impaired posture - neuro re-education, therapeutic activities and home program    Therapy Evaluation Codes:   1) History comprised of:   Personal factors that impact the plan of care:      Age.    Comorbidity factors that impact the plan of care are:      High blood pressure, Stroke and Thyroid problems, Osteoporosis.     Medications impacting care: NSAIDs, Gabapentin, Plavix.  2) Examination of Body Systems comprised of:   Body structures and functions that impact the plan of care:      Cervical spine.   Activity limitations that impact the plan of care are:      Bending, Dressing, Lifting, Sleeping, Laying down and reaching.  3) Clinical presentation characteristics are:   Evolving/Changing.  4) Decision-Making    Moderate complexity using standardized patient assessment instrument and/or measureable assessment of functional outcome.  Cumulative Therapy Evaluation is: Low complexity.    Previous and current functional limitations:  (See Goal Flow Sheet for this information)    Short term and Long term goals: (See Goal Flow Sheet for this information)     Communication ability:  Patient appears to be able to clearly communicate and understand verbal and written communication and follow directions correctly.  Treatment Explanation - The following has been discussed with the patient:   RX  ordered/plan of care  Anticipated outcomes  Possible risks and side effects  This patient would benefit from PT intervention to resume normal activities.   Rehab potential is good.    Frequency:  2 X week, once daily  Duration:  for 1 weeks tapering to 1 X a week over 7 weeks  Discharge Plan:  Achieve all LTG.  Independent in home treatment program.  Reach maximal therapeutic benefit.    Please refer to the daily flowsheet for treatment today, total treatment time and time spent performing 1:1 timed codes.

## 2022-02-04 ENCOUNTER — THERAPY VISIT (OUTPATIENT)
Dept: PHYSICAL THERAPY | Facility: CLINIC | Age: 87
End: 2022-02-04
Payer: COMMERCIAL

## 2022-02-04 DIAGNOSIS — M54.2 NECK PAIN: ICD-10-CM

## 2022-02-04 DIAGNOSIS — M25.512 ACUTE PAIN OF BOTH SHOULDERS: ICD-10-CM

## 2022-02-04 DIAGNOSIS — M54.41 ACUTE RIGHT-SIDED LOW BACK PAIN WITH RIGHT-SIDED SCIATICA: ICD-10-CM

## 2022-02-04 DIAGNOSIS — M25.511 ACUTE PAIN OF BOTH SHOULDERS: ICD-10-CM

## 2022-02-04 PROCEDURE — 97110 THERAPEUTIC EXERCISES: CPT | Mod: GP | Performed by: PHYSICAL THERAPIST

## 2022-02-04 PROCEDURE — 97140 MANUAL THERAPY 1/> REGIONS: CPT | Mod: GP | Performed by: PHYSICAL THERAPIST

## 2022-02-14 ENCOUNTER — THERAPY VISIT (OUTPATIENT)
Dept: PHYSICAL THERAPY | Facility: CLINIC | Age: 87
End: 2022-02-14
Payer: COMMERCIAL

## 2022-02-14 DIAGNOSIS — M54.2 NECK PAIN: ICD-10-CM

## 2022-02-14 DIAGNOSIS — M25.512 ACUTE PAIN OF BOTH SHOULDERS: ICD-10-CM

## 2022-02-14 DIAGNOSIS — M25.511 ACUTE PAIN OF BOTH SHOULDERS: ICD-10-CM

## 2022-02-14 PROCEDURE — 97110 THERAPEUTIC EXERCISES: CPT | Mod: GP | Performed by: PHYSICAL THERAPIST

## 2022-02-14 PROCEDURE — 97140 MANUAL THERAPY 1/> REGIONS: CPT | Mod: GP | Performed by: PHYSICAL THERAPIST

## 2022-02-28 ENCOUNTER — THERAPY VISIT (OUTPATIENT)
Dept: PHYSICAL THERAPY | Facility: CLINIC | Age: 87
End: 2022-02-28
Payer: COMMERCIAL

## 2022-02-28 DIAGNOSIS — M54.2 NECK PAIN: ICD-10-CM

## 2022-02-28 DIAGNOSIS — M54.41 ACUTE RIGHT-SIDED LOW BACK PAIN WITH RIGHT-SIDED SCIATICA: ICD-10-CM

## 2022-02-28 DIAGNOSIS — M25.511 ACUTE PAIN OF BOTH SHOULDERS: ICD-10-CM

## 2022-02-28 DIAGNOSIS — M25.512 ACUTE PAIN OF BOTH SHOULDERS: ICD-10-CM

## 2022-02-28 PROCEDURE — 97110 THERAPEUTIC EXERCISES: CPT | Mod: GP | Performed by: PHYSICAL THERAPIST

## 2022-06-13 PROBLEM — M54.41 ACUTE RIGHT-SIDED LOW BACK PAIN WITH RIGHT-SIDED SCIATICA: Status: RESOLVED | Noted: 2022-01-08 | Resolved: 2022-06-13

## 2022-06-13 PROBLEM — M25.511 ACUTE PAIN OF BOTH SHOULDERS: Status: RESOLVED | Noted: 2022-01-31 | Resolved: 2022-06-13

## 2022-06-13 PROBLEM — M25.512 ACUTE PAIN OF BOTH SHOULDERS: Status: RESOLVED | Noted: 2022-01-31 | Resolved: 2022-06-13

## 2022-06-13 PROBLEM — M54.2 NECK PAIN: Status: RESOLVED | Noted: 2022-01-31 | Resolved: 2022-06-13

## 2022-06-13 NOTE — PROGRESS NOTES
Discharge Note    Progress reporting period is from initial evaluation date (please see noted date below) to Feb 28, 2022.  Linked Episodes   Type: Episode: Status: Noted: Resolved: Last update: Updated by:   PHYSICAL THERAPY lumbar radiculopathy 1/8/2022 Active 1/8/2022 2/28/2022  9:08 AM Rika Galarza, PT      Comments:   PHYSICAL THERAPY neck and R shoulder pain 1/31/2022 Active 1/31/2022 2/28/2022  9:08 AM Rika Galarza, PT      Comments:       Henry failed to follow up and current status is unknown.  Please see information below for last relevant information on current status.  Patient seen for 4 visits.    SUBJECTIVE  Subjective changes noted by patient:  Patient relates that she had to stop the rotation in flexion for the lumbar spine as it bothered her L leg.  She is not sure if it helped for the R side of her back, but she could feel symptoms at the R thigh during.  No worse with stopping the rotation in flexion.  Has continued with lumbar  FISit (flexion in sitting) 3 times a day and that does not seem to bother her.  Standing and walking tolerance has improved overall.    No longer having neck pain and has not been doing her neck exercises.  Can feel her shoulders from time to time, but not consistent.  Does not sleep well and does not think that it is her neck waking her.  She has continued to use a pillow under her head for sleeping.  .  Current pain level is 0/10 (for the neck and lower back has a slight ache).      Changes in function:  Yes (See Goal flowsheet attached for changes in current functional level)  Adverse reaction to treatment or activity: None    OBJECTIVE  Changes noted in objective findings: full neck rotation without pain.  does not feel pain in the neck with extension  LROM full FIS, symmetrical SGIS, major loss of EIS.  Had pain in the R lower back and into the R hip/thigh with seated pull downs.  Could still feel pain in the back with pull downs after lumbar  extension in standing.       ASSESSMENT/PLAN  Diagnosis: lumbar spinal stenosis   Updated problem list and treatment plan:   Pain - HEP  Decreased ROM/flexibility - HEP  Decreased function - HEP  STG/LTGs have been met or progress has been made towards goals:  Yes, please see goal flowsheet for most current information  Assessment of Progress: current status is unknown.    Last current status: Pt is progressing as expected   Self Management Plans:  HEP  I have re-evaluated this patient and find that the nature, scope, duration and intensity of the therapy is appropriate for the medical condition of the patient.  Henry continues to require the following intervention to meet STG and LTG's:  HEP.    Recommendations:  Discharge with current home program.  Patient to follow up with MD as needed.    Please refer to the daily flowsheet for treatment today, total treatment time and time spent performing 1:1 timed codes.

## 2022-07-07 ENCOUNTER — TRANSCRIBE ORDERS (OUTPATIENT)
Dept: OTHER | Age: 87
End: 2022-07-07

## 2022-07-07 DIAGNOSIS — M79.605 PAIN IN BOTH LOWER EXTREMITIES: ICD-10-CM

## 2022-07-07 DIAGNOSIS — M54.41 BILATERAL LOW BACK PAIN WITH BILATERAL SCIATICA, UNSPECIFIED CHRONICITY: Primary | ICD-10-CM

## 2022-07-07 DIAGNOSIS — M54.42 BILATERAL LOW BACK PAIN WITH BILATERAL SCIATICA, UNSPECIFIED CHRONICITY: Primary | ICD-10-CM

## 2022-07-07 DIAGNOSIS — M79.604 PAIN IN BOTH LOWER EXTREMITIES: ICD-10-CM

## 2022-07-19 ENCOUNTER — THERAPY VISIT (OUTPATIENT)
Dept: PHYSICAL THERAPY | Facility: CLINIC | Age: 87
End: 2022-07-19
Payer: COMMERCIAL

## 2022-07-19 DIAGNOSIS — M54.41 ACUTE RIGHT-SIDED LOW BACK PAIN WITH BILATERAL SCIATICA: ICD-10-CM

## 2022-07-19 DIAGNOSIS — M54.42 ACUTE RIGHT-SIDED LOW BACK PAIN WITH BILATERAL SCIATICA: ICD-10-CM

## 2022-07-19 PROCEDURE — 97110 THERAPEUTIC EXERCISES: CPT | Mod: GP | Performed by: PHYSICAL THERAPIST

## 2022-07-19 PROCEDURE — 97161 PT EVAL LOW COMPLEX 20 MIN: CPT | Mod: GP | Performed by: PHYSICAL THERAPIST

## 2022-07-19 NOTE — PROGRESS NOTES
[facility]    OUTPATIENT Physical Therapy ORTHOPEDIC EVALUATION  PLAN OF TREATMENT FOR OUTPATIENT REHABILITATION  (COMPLETE FOR INITIAL CLAIMS ONLY)  Patient's Last Name, First Name, M.I.  YOB: 1929  Henry España    Provider s Name:  [facility]   Medical Record No.  [id number]   Start of Care Date:  07/19/22   Onset Date:    (mid June 2022)   Type:     _X__PT   ___OT Medical Diagnosis:    Encounter Diagnosis   Name Primary?    Acute right-sided low back pain with bilateral sciatica         Treatment Diagnosis:  acute on chronic low back pain with B LE sciatica        Goals:     07/19/22 0500   Body Part   Goals listed below are for back and LEs   Goal #1   Goal #1 ambulation   Previous Functional Level No restrictions;with walker   Current Functional Level Minutes patient can walk;with walker   Performance Level sharp abdominal pain produced with increased back pain within a few steps of walking   STG Target Performance Minutes patient will be able to walk   Performance Level 10 minutes without limp or increased pain   Rationale for safe household ambulation   Due Date 08/09/22    LTG Target Performance Minutes patient will be able to  walk   Performance Level 20 minutes without increased back, abdominal or LE symptoms'   Rationale for safe household ambulation;for safe community ambulation   Due Date 09/17/22           Therapy Frequency:  1 time a week  Predicted Duration of Therapy Intervention:  8 dona Galarza, PT                 I CERTIFY THE NEED FOR THESE SERVICES FURNISHED UNDER        THIS PLAN OF TREATMENT AND WHILE UNDER MY CARE .             Physician Signature               Date    X_____________________________________________________                         Certification Date From:  07/19/22   Certification Date To:   09/17/22    Referring Provider:  Jessica Adams    Initial Assessment        See Epic Evaluation SOC Date: 07/19/22

## 2022-07-19 NOTE — PROGRESS NOTES
[facility] Initial Evaluation -- Lumbar    Date: July 19, 2022  Henry Albrecht is a 89 year old female with a lumbar condition.   Referral: Dr. Bryan Barrientos mechanical stresses:    Employment status:  retired  Leisure mechanical stresses: playing cards  Functional disability score (DEBORAH/STarT Back): 55.56/ high (8)  VAS score (0-10): 6/10  Patient goals/expectations:  Alleviate anterior abdominal and back pain with walking.      HISTORY:    Present symptoms: pain on the R side of her back with radiation to the anterior upper to lower abdominal region, near midline.  She will develop B lower leg symptoms when sitting on certain chairs.  Pain quality (sharp/shooting/stabbing/aching/burning/cramping):  Severe, sharp pain with movement, ache at rest; c/o neuropathy for her feet and hands   Paresthesia (yes/no):  yes    Present since (onset date): mid July 2022.     Symptoms (improving/unchanging/worsening):  worsening.     Symptoms commenced as a result of: unknown   Condition occurred in the following environment:   home     Symptoms at onset (back/thigh/leg): back and abdominal region  Constant symptoms (back/thigh/leg): back  Intermittent symptoms (back/thigh/leg): abdominal pain, B lower extremity symptoms    Symptoms are made worse with the following: Always Rising, Always Standing, Always Walking, Time of day - No effect (more dependent on activity vs time of day), Always On the move and Other - Lifting   Symptoms are made better with the following: Always Sitting    Disturbed sleep (yes/no):  yes Sleeping postures (prone/sup/side R/L): sided    Previous history: has had back and LE symptoms in the past; PT earlier this year with intermittent relief.  Has continued with lumbar  flexion in sitting without relief in current symptoms.      Specific Questions:  Cough/Sneeze/Strain (pos/neg): negative  Bowel/Bladder (normal/abnormal): no change with flare of symptoms  Gait (normal/abnormal):  antalgic - limping due to symptoms   Medications (nil/NSAIDS/analg/steroids/anticoag/other):  NSAIDS, Muscle relaxants and Other - Thyroid and High blood pressure  Medical allergies:  Asprin  General health (excellent/good/fair/poor):  fair  Pertinent medical history:  High blood pressure, Rheumatoid arthritis, Stroke, Thyroid problems and neuropathy of legs and hands  Imaging (None/Xray/MRI/Other):  MRI  Recent or major surgery (yes/no):  None recent; 8/6/2015-Lumbar laminectomy, Bilateral: L2-S1 LAMINECTOMY AND BILATERAL FORAMINOTOMIES;    2011 Back surgery L2-3 and L3-4 lumbar decompression  Night pain (yes/no): no  Accidents (yes/no): no  Unexplained weight loss (yes/no): no  Barriers at home: walking is more painful  Other red flags: no    EXAMINATION    Posture:   Sitting (good/fair/poor): poor  Standing (good/fair/poor):poor  Lordosis (red/acc/normal): reduced  Correction of posture (better/worse/no effect): better    Lateral Shift (right/left/nil): left  Relevant (yes/no):  yes  Other Observations: scoliosis.  Walking with a walker, wheels on the front - leaning forward.  Limping on the R side due to pain    Neurological:    Motor deficit:  Not assessed  Reflexes:  Symmetrical and brisk LE  Sensory deficit:  Not assessed  Dural signs:  Not assessed    Movement Loss:   Matthias Mod Min Nil Pain   Flexion    x No effect   Extension x    Stretch for low back   Side Gliding R x    Stretch for low back   Side Gliding L    x Back pain     Test Movements:   During: produces, abolishes, increases, decreases, no effect, centralizing, peripheralizing   After: better, worse, no better, no worse, no effect, centralized, peripheralized    Pretest symptoms standing: not tested   Symptoms During Symptoms After ROM increased ROM decreased No Effect   FIS        Rep FIS        EIS        Rep EIS          Pretest symptoms lying: not tested    Symptoms During Symptoms After ROM increased ROM decreased No Effect   KARINA        Rep KARINA         EIL        Rep EIL          If required, pretest symptoms: 6/10 back and R abdominal pain   Symptoms During Symptoms After ROM increased ROM decreased No Effect   SGIS - R Decreases    No Better         Rep SGIS - R Decreases    No Better    Inc EIS and R SGIS     SGIS - L        Rep SGIS - L          Static Tests:  Sitting slouched:  Not assessed   Sitting erect:  Not assessed  Standing slouched: not assessed              Standing erect:  Not assessed  Lying prone in extension:  Not assessed             Long sitting:  Not assessed    Other Tests: manual R SGIS - dec B; increase ease with rise from sit and less pain with initial steps after exercise.    Provisional Classification:  Inconclusive/Other - Mechanically Unresponsive Radiculopathy and scoliosis    Principle of Management:  Education:  Discussed proper sitting posture and avoidance of flexion biased postures or activities (she is to discontinue lumbar flexion in sitting exercise which she was previously given in PT as this has not been helping with her symptoms),  Discussed centralization of symptoms    Equipment provided:  Rolled towel to use for car ride  Mechanical therapy (Y/N):  yes   Extension principle:    Lateral Principle:  R SGIS 10 reps, 6 times a day (will do at the wall when daughter is present, otherwise, will do them standing with her back to the counter, active for safety reasons).    Flexion principle:    Other:      ASSESSMENT/PLAN:    Patient is a 89 year old female with lumbar complaints.    Patient has the following significant findings with corresponding treatment plan.                Diagnosis 1:  Low back pain, B LE pain    Pain -  manual therapy, self management, education, directional preference exercise, home program and will add modalities as needed  Decreased ROM/flexibility - manual therapy, therapeutic exercise and home program  Decreased joint mobility - manual therapy, therapeutic exercise and home program  Impaired  muscle performance - neuro re-education and home program  Decreased function - therapeutic activities and home program  Impaired posture - neuro re-education, therapeutic activities and home program    Therapy Evaluation Codes:   1) History comprised of:   Personal factors that impact the plan of care:      Age.    Comorbidity factors that impact the plan of care are:      High blood pressure, Numbness/tingling, Rheumatoid arthritis, Stroke and scoliosis.     Medications impacting care: Anti-inflammatory, High blood pressure, Muscle relaxant and Thyroid medication.  2) Examination of Body Systems comprised of:   Body structures and functions that impact the plan of care:      Lumbar spine.   Activity limitations that impact the plan of care are:      Lifting, Standing, Walking and Sleeping.  3) Clinical presentation characteristics are:   Evolving/Changing.  4) Decision-Making    Moderate complexity using standardized patient assessment instrument and/or measureable assessment of functional outcome.  Cumulative Therapy Evaluation is: Moderate complexity.    Previous and current functional limitations:  (See Goal Flow Sheet for this information)    Short term and Long term goals: (See Goal Flow Sheet for this information)     Communication ability:  Patient appears to be able to clearly communicate and understand verbal and written communication and follow directions correctly.  Daughter is present to assist with care.  Patient is hard of hearing.   Treatment Explanation - The following has been discussed with the patient:   RX ordered/plan of care  Anticipated outcomes  Possible risks and side effects  This patient would benefit from PT intervention to resume normal activities.   Rehab potential is good.    Frequency:  1 X week, once daily  Duration:  for 8 weeks  Discharge Plan:  Achieve all LTG.  Independent in home treatment program.  Reach maximal therapeutic benefit.    Please refer to the daily flowsheet for  treatment today, total treatment time and time spent performing 1:1 timed codes.

## 2022-08-23 ENCOUNTER — TRANSCRIBE ORDERS (OUTPATIENT)
Dept: OTHER | Age: 87
End: 2022-08-23

## 2022-08-23 DIAGNOSIS — M54.50 LOW BACK PAIN: Primary | ICD-10-CM

## 2022-08-23 DIAGNOSIS — M25.551 RIGHT HIP PAIN: ICD-10-CM

## 2022-08-23 DIAGNOSIS — M54.16 LUMBAR RADICULITIS: ICD-10-CM

## 2022-09-01 ENCOUNTER — THERAPY VISIT (OUTPATIENT)
Dept: PHYSICAL THERAPY | Facility: CLINIC | Age: 87
End: 2022-09-01
Attending: PHYSICAL MEDICINE & REHABILITATION
Payer: COMMERCIAL

## 2022-09-01 DIAGNOSIS — M54.42 ACUTE RIGHT-SIDED LOW BACK PAIN WITH BILATERAL SCIATICA: Primary | ICD-10-CM

## 2022-09-01 DIAGNOSIS — M54.41 ACUTE RIGHT-SIDED LOW BACK PAIN WITH BILATERAL SCIATICA: Primary | ICD-10-CM

## 2022-09-01 PROCEDURE — 97110 THERAPEUTIC EXERCISES: CPT | Mod: GP | Performed by: PHYSICAL THERAPIST

## 2022-09-02 NOTE — PROGRESS NOTES
PROGRESS  REPORT    Progress reporting period is from 7/19/2022 to 9/1/2022.       SUBJECTIVE  Subjective changes noted by patient:  none.  Subjective: Patient relates that she saw Dr. Garcia for her lower back/LE symptoms.  He recommended working on gait for her as she is limping significantly when tries to walk without her walker.  She had a nerve root injection on the R side, 8/10/2022.  The injection did help with her back and R LE symptoms but did not impact her groin pain.   She had an injection for her R hip on 8/24/2022, which did help to alleviate the R groin/hip pain; knee cracked that day and did not think her walking worsened as a result of the pop.  She does need a R TKA but due to her age, she is planning not to have the surgery.  Thus, she would like to work on strengthening for her LEs to help with her walking.    She does continue to feel symptoms for the lower back with a burning into the R LE.   Current pain level is 2-3/10, burning R LE.     Initial Pain level: 6/10.   Changes in function:  None  Adverse reaction to treatment or activity: None    OBJECTIVE  Changes noted in objective findings:  None  She continues to have a major loss of lumbar extension and R SGIS  She ambulates in a flexed posture with a wheeled walker.  Her R LE will cross midline during ambulation with her R knee in a valgus position, foot externally rotated with low arch.  She is not able to do a SLR for the R LE without lag - able to start lifting with her knee in extension and will flex as she continues to lift.  Developed a burning into the R LE with trial of glute medius strengthening and hip abduction; better if limits motion.  Glutes are very weak and not really able to lift her hips off the table with bridges.    Not able to actively extend her knee to full extension in sitting - about 40 deg loss to reach full extension.      ASSESSMENT/PLAN  Updated problem list and treatment plan:   Diagnosis 1:    M54.50  (ICD-10-CM) - Low back pain   M54.16 (ICD-10-CM) - Lumbar radiculitis   M25.551 (ICD-10-CM) - Right hip pain     Pain -  self management, education, home program, modalities and/or manual therapy as needed  Decreased ROM/flexibility - manual therapy, therapeutic exercise and home program  Decreased strength - therapeutic exercise, therapeutic activities and home program  Decreased proprioception - neuro re-education, therapeutic activities and home program  Impaired gait - home program, therapeutic exercises, therapeutic activity  Impaired muscle performance - neuro re-education and home program  Decreased function - therapeutic activities and home program  STG/LTGs have been met or progress has been made towards goals:  None  Assessment of Progress: The patient's condition is unchanged.  Pain has improved since her injections.  Has not had an improvement in strength, ability to walk or function  Self Management Plans:  Patient has been instructed in a home treatment program.  Patient  has been instructed in self management of symptoms.  I have re-evaluated this patient and find that the nature, scope, duration and intensity of the therapy is appropriate for the medical condition of the patient.  Henry continues to require the following intervention to meet STG and LTG's:  PT    Recommendations:  This patient would benefit from continued therapy.     Frequency:  1 X week, once daily  Duration:  for 8 weeks        Please refer to the daily flowsheet for treatment today, total treatment time and time spent performing 1:1 timed codes.

## 2022-09-02 NOTE — PROGRESS NOTES
[facility]    OUTPATIENT Physical Therapy ORTHOPEDIC EVALUATION  PLAN OF TREATMENT FOR OUTPATIENT REHABILITATION  (COMPLETE FOR INITIAL CLAIMS ONLY)  Patient's Last Name, First Name, M.I.  YOB: 1929  Henry España    Provider s Name:  [facility]   Medical Record No.  [id number]   Start of Care Date:  07/19/22   Onset Date:    (mid June 2022)   Type:     _X__PT   ___OT Medical Diagnosis:    Encounter Diagnosis   Name Primary?    Acute right-sided low back pain with bilateral sciatica Yes        Treatment Diagnosis:  acute on chronic low back pain with B LE sciatica        Goals:     09/01/22 0500   Body Part   Goals listed below are for back and LEs   Goal #1   Goal #1 ambulation   Previous Functional Level No restrictions;with walker   Current Functional Level Minutes patient can walk;with walker   Performance Level pain is better for the back and R LE since injection but without improvement in gait   STG Target Performance Minutes patient will be able to walk   Performance Level 10 minutes without limp or increased pain   Rationale for safe household ambulation   Due Date 09/30/22    LTG Target Performance Minutes patient will be able to  walk   Performance Level 20 minutes without increased back, abdominal or LE symptoms'   Rationale for safe household ambulation;for safe community ambulation   Due Date 10/31/22         Therapy Frequency:  1 time a week  Predicted Duration of Therapy Intervention:  8 dona Galarza, PT                 I CERTIFY THE NEED FOR THESE SERVICES FURNISHED UNDER        THIS PLAN OF TREATMENT AND WHILE UNDER MY CARE .             Physician Signature               Date    X_____________________________________________________                         Certification Date From:  09/01/22   Certification Date To:   10/31/22    Referring Provider:  Boston Garcia    Initial Assessment        See Epic Evaluation SOC Date: 07/19/22

## 2022-10-31 ENCOUNTER — TRANSCRIBE ORDERS (OUTPATIENT)
Dept: OTHER | Age: 87
End: 2022-10-31

## 2022-10-31 DIAGNOSIS — M75.42 IMPINGEMENT SYNDROME OF BOTH SHOULDERS: Primary | ICD-10-CM

## 2022-10-31 DIAGNOSIS — M75.41 IMPINGEMENT SYNDROME OF BOTH SHOULDERS: Primary | ICD-10-CM

## 2022-11-16 ENCOUNTER — THERAPY VISIT (OUTPATIENT)
Dept: PHYSICAL THERAPY | Facility: CLINIC | Age: 87
End: 2022-11-16
Payer: COMMERCIAL

## 2022-11-16 DIAGNOSIS — M75.42 IMPINGEMENT SYNDROME OF BOTH SHOULDERS: ICD-10-CM

## 2022-11-16 DIAGNOSIS — M25.511 CHRONIC PAIN OF BOTH SHOULDERS: ICD-10-CM

## 2022-11-16 DIAGNOSIS — M25.512 CHRONIC PAIN OF BOTH SHOULDERS: ICD-10-CM

## 2022-11-16 DIAGNOSIS — M75.41 IMPINGEMENT SYNDROME OF BOTH SHOULDERS: ICD-10-CM

## 2022-11-16 DIAGNOSIS — G89.29 CHRONIC PAIN OF BOTH SHOULDERS: ICD-10-CM

## 2022-11-16 PROCEDURE — 97110 THERAPEUTIC EXERCISES: CPT | Mod: GP | Performed by: PHYSICAL THERAPIST

## 2022-11-16 PROCEDURE — 97161 PT EVAL LOW COMPLEX 20 MIN: CPT | Mod: GP | Performed by: PHYSICAL THERAPIST

## 2022-11-16 NOTE — PROGRESS NOTES
[facility]    OUTPATIENT Physical Therapy ORTHOPEDIC EVALUATION  PLAN OF TREATMENT FOR OUTPATIENT REHABILITATION  (COMPLETE FOR INITIAL CLAIMS ONLY)  Patient's Last Name, First Name, M.I.  YOB: 1929  Henry España    Provider s Name:  [facility]   Medical Record No.  [id number]   Start of Care Date:  11/16/22   Onset Date:       Treatment Diagnosis:    Medical Diagnosis:  Impingement syndrome of both shoulders       Goals:     11/16/22 0500   Body Part   Goals listed below are for B shoulders   Goal #2   Goal #2 reaching   Previous Functional Level No restrictions   Current Functional Level Cannot reach;to shoulder;overhead;out to the side   Performance level AROM limited to 125/95 with pain 7/10   STG Target Performance Reach;to counter height;to shoulder level;overhead   Performance level AROM 130/110 with pain 3/10 or less   Rationale for independent personal hygiene;for dressing;for bathing;for meal preparation   Due date 12/14/22   LTG Target Performance Reach;to counter height;to shoulder level;overhead;out to the side   Performance Level AROM 135/120 with pain 1/10 or less   Rationale for independent personal hygiene;for dressing;for bathing;for meal preparation   Due date 02/02/23       Therapy Frequency:  1x every other week  Predicted Duration of Therapy Intervention:  12 weeks    Lolis Mckinley DPT                 I CERTIFY THE NEED FOR THESE SERVICES FURNISHED UNDER        THIS PLAN OF TREATMENT AND WHILE UNDER MY CARE .             Physician Signature               Date    X_____________________________________________________                         Certification Date From:  11/16/22   Certification Date To:  02/02/23    Referring Provider:  Gregoria Adorno    Initial Assessment        See Epic Evaluation SOC Date: 11/16/22

## 2022-11-16 NOTE — PROGRESS NOTES
Physical Therapy Initial Evaluation  Subjective:  Pt reports that she originally began to have R shoulder pain that started about 6 months ago and she has been doctoring with NALLELY Rosen and has had 2 injections on her L shoulder that she feels like either was very helpful (on 9-27-22 and second was 2 weeks later). She also reports she fell in October and most recently on 11-4-22.     The history is provided by the patient. No  was used.   Patient Health History         Pain is reported as 7/10 on pain scale.  General health as reported by patient is fair.  Pertinent medical history includes: high blood pressure, rheumatoid arthritis, stroke and thyroid problems.   Red flags:  Significant weakness and foot drop.  Medical allergies: none.   Surgeries include:  Orthopedic surgery and other.    Current medications:  Cardiac medication, high blood pressure medication and thyroid medication.    Current occupation is retired.                     Therapist Generated HPI Evaluation         Type of problem:  Bilateral shoulders.    This is a chronic condition.  Condition occurred with:  A fall, repetition/overuse and unknown cause.  Where condition occurred: for unknown reasons.  Patient reports pain:  Anterior, in the joint, lateral, scapular area and upper trap.  Pain is described as aching, burning and sharp and is intermittent.  Pain radiates to:  Cervical, shoulder and upper arm. Pain is worse in the A.M., worse in the P.M. and worse during the night.  Since onset symptoms are unchanged.  Associated symptoms:  Loss of strength, painful arc, loss of motion/stiffness and catching. Symptoms are exacerbated by carrying, lifting, lying on extremity, using arm at shoulder level, using arm behind back and using arm overhead  and relieved by heat and rest.  Special tests included:  X-ray.  Past treatment: has had 2 injections on her L that were not very helpful but has also sustaing 2 falls recently  which could be contributing to pain.   Barriers include:  Lives alone, requires assistance with ADL's and transportation.                        Objective:  System                   Shoulder Evaluation:  ROM:  AROM:    Flexion:  Left:  105    Right:  120    Abduction:  Left: 75 (improved to 95 after cervical extension)   Right:  115      External Rotation:  Left:  15    Right:  30            Extension/Internal Rotation:  Left:  L5    Right:  L2          Strength:  : strength not tested today due to pain with AROM.                        Special Tests:  not assessed      Palpation:    Left shoulder tenderness present at:  Biceps; Supraspinatus; Levator; Rhomboids and Upper Trap  Right shoulder tenderness present at: Levator; Rhomboids and Upper Trap  Mobility Tests:  not assessed                                                 [place] Cervical Evaluation        Test Movements:      RET: During: no effect  After: no effect  Mechanical Response: no effect  Repeat RET: During: no effect  After: no effect  Mechanical Response: no effect  RET EXT: During: no effect  After: better  Mechanical Response: IncROM  Repeat RET EXT: During: no effect  After: better  Mechanical Response: IncROM                                                                   ROS    Assessment/Plan:    Patient is a 89 year old female with both sides shoulder complaints.    Patient has the following significant findings with corresponding treatment plan.                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  2) Decision-Making    Low complexity  Cumulative Therapy Evaluation is: Low complexity.    Previous and current functional limitations:  (See Goal Flow Sheet for this information)    Short term and Long term goals: (See Goal Flow Sheet for this information)     Communication ability:  Patient appears to be able to clearly communicate and understand verbal and written communication and follow directions correctly.  Treatment Explanation - The following has been discussed with the patient:   RX ordered/plan of care  Anticipated outcomes  Possible risks and side effects  This patient would benefit from PT intervention to resume normal activities.   Rehab potential is good.    Frequency:  1 X week, once daily every other week  Duration:  for 12 weeks  Discharge Plan:  Achieve all LTG.  Independent in home treatment program.  Reach maximal therapeutic benefit.    Please refer to the daily flowsheet for treatment today, total treatment time and time spent performing 1:1 timed codes.

## 2022-11-17 PROBLEM — M54.41 ACUTE RIGHT-SIDED LOW BACK PAIN WITH BILATERAL SCIATICA: Status: RESOLVED | Noted: 2022-07-19 | Resolved: 2022-11-17

## 2022-11-17 PROBLEM — M54.42 ACUTE RIGHT-SIDED LOW BACK PAIN WITH BILATERAL SCIATICA: Status: RESOLVED | Noted: 2022-07-19 | Resolved: 2022-11-17

## 2022-11-17 NOTE — PROGRESS NOTES
Patient did not return for further treatment and no additional progress was noted.  Please refer to the progress note and goal flowsheet completed on  9/2/2022 for discharge information.

## 2023-02-15 PROBLEM — M25.511 CHRONIC PAIN OF BOTH SHOULDERS: Status: RESOLVED | Noted: 2022-11-16 | Resolved: 2023-02-15

## 2023-02-15 PROBLEM — M25.512 CHRONIC PAIN OF BOTH SHOULDERS: Status: RESOLVED | Noted: 2022-11-16 | Resolved: 2023-02-15

## 2023-02-15 PROBLEM — G89.29 CHRONIC PAIN OF BOTH SHOULDERS: Status: RESOLVED | Noted: 2022-11-16 | Resolved: 2023-02-15

## 2023-02-15 NOTE — PROGRESS NOTES
Patient did not return for further treatment and no additional progress was noted.  Please refer to the IE as DC

## 2023-09-05 ENCOUNTER — TRANSCRIBE ORDERS (OUTPATIENT)
Dept: OTHER | Age: 88
End: 2023-09-05

## 2023-09-05 DIAGNOSIS — M54.42 ACUTE LEFT-SIDED LOW BACK PAIN WITH LEFT-SIDED SCIATICA: Primary | ICD-10-CM

## 2023-09-14 ENCOUNTER — THERAPY VISIT (OUTPATIENT)
Dept: PHYSICAL THERAPY | Facility: CLINIC | Age: 88
End: 2023-09-14
Attending: PHYSICIAN ASSISTANT
Payer: COMMERCIAL

## 2023-09-14 DIAGNOSIS — M54.42 ACUTE LEFT-SIDED LOW BACK PAIN WITH LEFT-SIDED SCIATICA: ICD-10-CM

## 2023-09-14 PROCEDURE — 97161 PT EVAL LOW COMPLEX 20 MIN: CPT | Mod: GP | Performed by: PHYSICAL THERAPIST

## 2023-09-14 PROCEDURE — 97110 THERAPEUTIC EXERCISES: CPT | Mod: GP | Performed by: PHYSICAL THERAPIST

## 2023-09-14 NOTE — PROGRESS NOTES
PHYSICAL THERAPY EVALUATION  Type of Visit: Evaluation    See electronic medical record for Abuse and Falls Screening details.    Subjective       Presenting condition or subjective complaint: lower pain on L side with burning doing down my left legPt reports onset of burning pain in L leg in March 2023 when standing. The pain has continued, saw MD and referred to PT. Daughter reports they were going to try prednisone but asked to try PT first. Hx of 2 lumbar surgeries, pt and daughter not exactly sure what surgery. Last was in 2015.     MRI 2022:    Laminectomies of partially visualized lower lumbar spine with anterolisthesis L4-L5. New minimially distracted fracture through the junction between the S4 and S5 sacral segments.      Date of onset: 03/01/23    Relevant medical history: Arthritis; Bladder or bowel problems; Foot drop; Hearing problems; High blood pressure; Osteoporosis; Pain at night or rest; Rheumatoid arthritis; Stroke; Vision problems   Dates & types of surgery: gallbladder/2 back surgeries/hysterectomy    Prior diagnostic imaging/testing results: CT scan; Bone scan     Prior therapy history for the same diagnosis, illness or injury: No      Prior Level of Function  Transfers:   Ambulation: Assistive equipment  ADL:   IADL:     Living Environment  Social support: Alone   Type of home: House; 2-story   Stairs to enter the home: Yes 4 Is there a railing: Yes   Ramp: No   Stairs inside the home: Yes (to basement but has a chairstair)   Is there a railing: Yes   Help at home: Self Cares (home health aide/personal care attendant, family, etc); Home management tasks (cooking, cleaning); Home and Yard maintenance tasks; Medication and/or finances; Assist for driving and community activities  Equipment owned: Walker; Walker with wheels; Bedrail; Grab bars; Bath bench     Employment: Not Applicable    Hobbies/Interests: playing cards/bingo    Patient goals for therapy: stand and wash dishes without taking a  break/can't keep up with cleaning my house    Pain assessment: See objective evaluation for additional pain details     Objective   LUMBAR SPINE EVALUATION  PAIN: Pain Level with Use: 7/10  Pain Location: L lower leg up into L buttock and L low back  Pain Quality: Burning  Pain Frequency: intermittent  Pain is Worst: acvitiy  Pain is Exacerbated By: sitting too long, walking too long, standing too long, worst is standing  Pain is Relieved By: lidocaine patches on back, lidocaine cream on lower leg   Pain Progression: Unchanged  INTEGUMENTARY (edema, incisions):   POSTURE: Standing Posture:   Sitting Posture: Thoracic kyphosis increased  GAIT:   Weightbearing Status:   Assistive Device(s): Walker (front wheeled)  Gait Deviations:   BALANCE/PROPRIOCEPTION:   WEIGHTBEARING ALIGNMENT: Lumbar/Pelvic WB Alignment:Lordosis decreased, Convex scoliosis R  NON-WEIGHTBEARING ALIGNMENT:    ROM:   (Degrees) Left AROM Left PROM  Right AROM Right PROM   Hip Flexion       Hip Extension       Hip Abduction       Hip Adduction       Hip Internal Rotation       Hip External Rotation       Knee Flexion       Knee Extension       Lumbar Side glide Min loss with end range L LBP in Min loss   Lumbar Flexion Min loss with ERP inc L LBP   Lumbar Extension Min-mod loss with ERP on L low back inc   Pain:   End feel:     Symptomatic response Mechanical response    During testing After testing Effect - increased ROM, decreased ROM, or key functional test No Effect   Pretest symptoms standing: L LBP     Rep FIS       Rep EIS No Effect No Effect NE/sx of burning did not come on sitting for 5 min after, denies burning walking to leave clinic      Pretest symptoms lying:     During testing After testing Effect - increased ROM, decreased ROM, or key functional test No Effect   Rep KARINA       Rep EIL         If required, pretest symptoms:    During testing After testing Effect - increased ROM, decreased ROM, or key functional test No Effect   Rep SGIS  "- R       Rep SGIS - L           PELVIC/SI SCREEN:   STRENGTH:     MYOTOMES:    Left Right   T12-L3 (Hip Flexion) 5 5   L2-4 (Quads)  5 5   L4 (Ankle DF) 5 3+   L5 (Great Toe Ext) 5 5   S1 (Toe Raise)       DTR S:   CORD SIGNS:   DERMATOMES:   NEURAL TENSION:   FLEXIBILITY:   LUMBAR/HIP Special Tests:    PELVIS/SI SPECIAL TESTS:   FUNCTIONAL TESTS:   PALPATION:   SPINAL SEGMENTAL CONCLUSIONS:       Assessment & Plan   CLINICAL IMPRESSIONS  Medical Diagnosis: Acute left-sided low back pain with left-sided sciatica    Treatment Diagnosis: L lumbar radiculopathy, provisional classification of \"other\"   Impression/Assessment: Patient is a 89 year old female with lumbar complaints.  The following significant findings have been identified: Pain, Decreased ROM/flexibility, Decreased joint mobility, Decreased strength, Inflammation, and Impaired posture. These impairments interfere with their ability to perform self care tasks, recreational activities, household chores, household mobility, and community mobility as compared to previous level of function.     Clinical Decision Making (Complexity):  Clinical Presentation: Stable/Uncomplicated  Clinical Presentation Rationale: based on medical and personal factors listed in PT evaluation  Clinical Decision Making (Complexity): Low complexity    PLAN OF CARE  Treatment Interventions:  Interventions: Manual Therapy, Neuromuscular Re-education, Therapeutic Activity, Therapeutic Exercise    Long Term Goals     PT Goal 1  Goal Identifier: sitting  Goal Description: Pt will be able to sit for 1 hour without L leg burning  Rationale: to maximize safety and independence within the community;to maximize safety and independence with transportation;to maximize safety and independence with self cares;to maximize safety and independence with performance of ADLs and functional tasks  Target Date: 12/12/23      Frequency of Treatment: 1x/wk  Duration of Treatment: 12 wks    Recommended " "Referrals to Other Professionals:   Education Assessment:        Risks and benefits of evaluation/treatment have been explained.   Patient/Family/caregiver agrees with Plan of Care.     Evaluation Time:     PT Eval, Low Complexity Minutes (22888): 20       Signing Clinician: Colby Ruby, PT      [facility]                                                                                   OUTPATIENT PHYSICAL THERAPY      PLAN OF TREATMENT FOR OUTPATIENT REHABILITATION   Patient's Last Name, First Name, Henry Paula YOB: 1929   Provider's Name   [facility]   Medical Record No.  [id number]     Onset Date: 03/01/23  Start of Care Date: 09/14/23     Medical Diagnosis:  Acute left-sided low back pain with left-sided sciatica      PT Treatment Diagnosis:  L lumbar radiculopathy, provisional classification of \"other\" Plan of Treatment  Frequency/Duration: 1x/wk/ 12 wks    Certification date from 09/14/23 to 12/12/23         See note for plan of treatment details and functional goals     Colby Ruby, PT                         I CERTIFY THE NEED FOR THESE SERVICES FURNISHED UNDER        THIS PLAN OF TREATMENT AND WHILE UNDER MY CARE .             Physician Signature               Date    X_____________________________________________________                    Referring Provider:  Vee Fernandez *      Initial Assessment  See Epic Evaluation- Start of Care Date: 09/14/23                "

## 2023-10-16 ENCOUNTER — THERAPY VISIT (OUTPATIENT)
Dept: PHYSICAL THERAPY | Facility: CLINIC | Age: 88
End: 2023-10-16
Payer: COMMERCIAL

## 2023-10-16 DIAGNOSIS — M54.42 ACUTE LEFT-SIDED LOW BACK PAIN WITH LEFT-SIDED SCIATICA: Primary | ICD-10-CM

## 2023-10-16 PROCEDURE — 97110 THERAPEUTIC EXERCISES: CPT | Mod: GP | Performed by: PHYSICAL THERAPIST

## 2023-12-07 PROBLEM — M54.42 ACUTE LEFT-SIDED LOW BACK PAIN WITH LEFT-SIDED SCIATICA: Status: RESOLVED | Noted: 2023-09-14 | Resolved: 2023-12-07

## 2023-12-07 NOTE — PROGRESS NOTES
DISCHARGE  Reason for Discharge: Patient has failed to schedule further appointments.    Equipment Issued:     Discharge Plan: Patient to continue home program.    Referring Provider:  Vee Fernandez *

## 2024-10-15 ENCOUNTER — LAB REQUISITION (OUTPATIENT)
Dept: LAB | Facility: CLINIC | Age: 89
End: 2024-10-15
Payer: COMMERCIAL

## 2024-10-15 DIAGNOSIS — U07.1 COVID-19: ICD-10-CM

## 2024-10-17 ENCOUNTER — LAB REQUISITION (OUTPATIENT)
Dept: LAB | Facility: CLINIC | Age: 89
End: 2024-10-17
Payer: COMMERCIAL

## 2024-10-17 DIAGNOSIS — I10 ESSENTIAL (PRIMARY) HYPERTENSION: ICD-10-CM

## 2024-10-17 DIAGNOSIS — U07.1 COVID-19: ICD-10-CM

## 2024-10-17 DIAGNOSIS — D64.89 OTHER SPECIFIED ANEMIAS: ICD-10-CM

## 2024-10-17 LAB
ALBUMIN SERPL BCG-MCNC: 3.3 G/DL (ref 3.5–5.2)
ALP SERPL-CCNC: 155 U/L (ref 40–150)
ALT SERPL W P-5'-P-CCNC: 10 U/L (ref 0–50)
ANION GAP SERPL CALCULATED.3IONS-SCNC: 10 MMOL/L (ref 7–15)
AST SERPL W P-5'-P-CCNC: 27 U/L (ref 0–45)
BILIRUB SERPL-MCNC: 0.9 MG/DL
BUN SERPL-MCNC: 17.2 MG/DL (ref 8–23)
CALCIUM SERPL-MCNC: 9 MG/DL (ref 8.8–10.4)
CHLORIDE SERPL-SCNC: 102 MMOL/L (ref 98–107)
CREAT SERPL-MCNC: 0.75 MG/DL (ref 0.51–0.95)
EGFRCR SERPLBLD CKD-EPI 2021: 73 ML/MIN/1.73M2
GLUCOSE SERPL-MCNC: 88 MG/DL (ref 70–99)
HCO3 SERPL-SCNC: 24 MMOL/L (ref 22–29)
POTASSIUM SERPL-SCNC: 4.1 MMOL/L (ref 3.4–5.3)
PROT SERPL-MCNC: 5.7 G/DL (ref 6.4–8.3)
SODIUM SERPL-SCNC: 136 MMOL/L (ref 135–145)

## 2024-10-17 PROCEDURE — P9604 ONE-WAY ALLOW PRORATED TRIP: HCPCS | Mod: ORL | Performed by: FAMILY MEDICINE

## 2024-10-17 PROCEDURE — 80053 COMPREHEN METABOLIC PANEL: CPT | Mod: ORL | Performed by: FAMILY MEDICINE

## 2024-10-17 PROCEDURE — 36415 COLL VENOUS BLD VENIPUNCTURE: CPT | Mod: ORL | Performed by: FAMILY MEDICINE

## 2024-10-18 LAB
BASOPHILS # BLD AUTO: 0 10E3/UL (ref 0–0.2)
BASOPHILS NFR BLD AUTO: 1 %
EOSINOPHIL # BLD AUTO: 0.2 10E3/UL (ref 0–0.7)
EOSINOPHIL NFR BLD AUTO: 4 %
ERYTHROCYTE [DISTWIDTH] IN BLOOD BY AUTOMATED COUNT: 12.9 % (ref 10–15)
HCT VFR BLD AUTO: 34.2 % (ref 35–47)
HGB BLD-MCNC: 10.7 G/DL (ref 11.7–15.7)
IMM GRANULOCYTES # BLD: 0 10E3/UL
IMM GRANULOCYTES NFR BLD: 1 %
LYMPHOCYTES # BLD AUTO: 1.7 10E3/UL (ref 0.8–5.3)
LYMPHOCYTES NFR BLD AUTO: 38 %
MCH RBC QN AUTO: 29.8 PG (ref 26.5–33)
MCHC RBC AUTO-ENTMCNC: 31.3 G/DL (ref 31.5–36.5)
MCV RBC AUTO: 95 FL (ref 78–100)
MONOCYTES # BLD AUTO: 0.4 10E3/UL (ref 0–1.3)
MONOCYTES NFR BLD AUTO: 10 %
NEUTROPHILS # BLD AUTO: 2.1 10E3/UL (ref 1.6–8.3)
NEUTROPHILS NFR BLD AUTO: 47 %
NRBC # BLD AUTO: 0 10E3/UL
NRBC BLD AUTO-RTO: 0 /100
PLATELET # BLD AUTO: 290 10E3/UL (ref 150–450)
RBC # BLD AUTO: 3.59 10E6/UL (ref 3.8–5.2)
WBC # BLD AUTO: 4.4 10E3/UL (ref 4–11)

## 2024-10-18 PROCEDURE — P9604 ONE-WAY ALLOW PRORATED TRIP: HCPCS | Mod: ORL | Performed by: FAMILY MEDICINE

## 2024-10-18 PROCEDURE — 36415 COLL VENOUS BLD VENIPUNCTURE: CPT | Mod: ORL | Performed by: FAMILY MEDICINE

## 2024-10-18 PROCEDURE — 85025 COMPLETE CBC W/AUTO DIFF WBC: CPT | Mod: ORL | Performed by: FAMILY MEDICINE

## 2024-10-22 ENCOUNTER — LAB REQUISITION (OUTPATIENT)
Dept: LAB | Facility: CLINIC | Age: 89
End: 2024-10-22
Payer: COMMERCIAL

## 2024-10-22 DIAGNOSIS — D64.89 OTHER SPECIFIED ANEMIAS: ICD-10-CM

## 2024-10-24 LAB
ERYTHROCYTE [DISTWIDTH] IN BLOOD BY AUTOMATED COUNT: 13.2 % (ref 10–15)
HCT VFR BLD AUTO: 36.1 % (ref 35–47)
HGB BLD-MCNC: 11.2 G/DL (ref 11.7–15.7)
MCH RBC QN AUTO: 29.9 PG (ref 26.5–33)
MCHC RBC AUTO-ENTMCNC: 31 G/DL (ref 31.5–36.5)
MCV RBC AUTO: 97 FL (ref 78–100)
PLATELET # BLD AUTO: 340 10E3/UL (ref 150–450)
RBC # BLD AUTO: 3.74 10E6/UL (ref 3.8–5.2)
WBC # BLD AUTO: 6.2 10E3/UL (ref 4–11)

## 2024-10-24 PROCEDURE — 36415 COLL VENOUS BLD VENIPUNCTURE: CPT | Mod: ORL | Performed by: FAMILY MEDICINE

## 2024-10-24 PROCEDURE — 85027 COMPLETE CBC AUTOMATED: CPT | Mod: ORL | Performed by: FAMILY MEDICINE

## 2024-10-24 PROCEDURE — P9604 ONE-WAY ALLOW PRORATED TRIP: HCPCS | Mod: ORL | Performed by: FAMILY MEDICINE

## 2025-08-22 ENCOUNTER — LAB REQUISITION (OUTPATIENT)
Dept: LAB | Facility: CLINIC | Age: OVER 89
End: 2025-08-22
Payer: COMMERCIAL

## 2025-08-22 DIAGNOSIS — I10 ESSENTIAL (PRIMARY) HYPERTENSION: ICD-10-CM

## 2025-08-25 LAB
ALBUMIN SERPL BCG-MCNC: 3 G/DL (ref 3.5–5.2)
ALP SERPL-CCNC: 207 U/L (ref 40–150)
ALT SERPL W P-5'-P-CCNC: 38 U/L (ref 0–50)
ANION GAP SERPL CALCULATED.3IONS-SCNC: 8 MMOL/L (ref 7–15)
AST SERPL W P-5'-P-CCNC: 41 U/L (ref 0–45)
BASOPHILS # BLD AUTO: 0.05 10E3/UL (ref 0–0.2)
BASOPHILS NFR BLD AUTO: 0.7 %
BILIRUB SERPL-MCNC: 0.4 MG/DL
BUN SERPL-MCNC: 10.9 MG/DL (ref 8–23)
CALCIUM SERPL-MCNC: 8.8 MG/DL (ref 8.8–10.4)
CHLORIDE SERPL-SCNC: 107 MMOL/L (ref 98–107)
CREAT SERPL-MCNC: 0.64 MG/DL (ref 0.51–0.95)
EGFRCR SERPLBLD CKD-EPI 2021: 80 ML/MIN/1.73M2
EOSINOPHIL # BLD AUTO: 0.11 10E3/UL (ref 0–0.7)
EOSINOPHIL NFR BLD AUTO: 1.6 %
ERYTHROCYTE [DISTWIDTH] IN BLOOD BY AUTOMATED COUNT: 13.4 % (ref 10–15)
GLUCOSE SERPL-MCNC: 126 MG/DL (ref 70–99)
HCO3 SERPL-SCNC: 23 MMOL/L (ref 22–29)
HCT VFR BLD AUTO: 29.6 % (ref 35–47)
HGB BLD-MCNC: 9.5 G/DL (ref 11.7–15.7)
IMM GRANULOCYTES # BLD: 0.09 10E3/UL
IMM GRANULOCYTES NFR BLD: 1.3 %
LYMPHOCYTES # BLD AUTO: 1.3 10E3/UL (ref 0.8–5.3)
LYMPHOCYTES NFR BLD AUTO: 19.2 %
MCH RBC QN AUTO: 29.4 PG (ref 26.5–33)
MCHC RBC AUTO-ENTMCNC: 32.1 G/DL (ref 31.5–36.5)
MCV RBC AUTO: 91.6 FL (ref 78–100)
MONOCYTES # BLD AUTO: 0.55 10E3/UL (ref 0–1.3)
MONOCYTES NFR BLD AUTO: 8.1 %
NEUTROPHILS # BLD AUTO: 4.67 10E3/UL (ref 1.6–8.3)
NEUTROPHILS NFR BLD AUTO: 69.1 %
NRBC # BLD AUTO: <0.03 10E3/UL
NRBC BLD AUTO-RTO: 0 /100
PLATELET # BLD AUTO: 332 10E3/UL (ref 150–450)
POTASSIUM SERPL-SCNC: 3.3 MMOL/L (ref 3.4–5.3)
PROT SERPL-MCNC: 5.4 G/DL (ref 6.4–8.3)
RBC # BLD AUTO: 3.23 10E6/UL (ref 3.8–5.2)
SODIUM SERPL-SCNC: 138 MMOL/L (ref 135–145)
WBC # BLD AUTO: 6.77 10E3/UL (ref 4–11)

## 2025-08-26 ENCOUNTER — LAB REQUISITION (OUTPATIENT)
Dept: LAB | Facility: CLINIC | Age: OVER 89
End: 2025-08-26
Payer: COMMERCIAL

## 2025-08-26 DIAGNOSIS — E87.6 HYPOKALEMIA: ICD-10-CM

## 2025-09-02 ENCOUNTER — LAB REQUISITION (OUTPATIENT)
Dept: LAB | Facility: CLINIC | Age: OVER 89
End: 2025-09-02
Payer: COMMERCIAL

## 2025-09-02 LAB
FERRITIN SERPL-MCNC: 100 NG/ML (ref 11–328)
HGB BLD-MCNC: 10.6 G/DL (ref 11.7–15.7)
IRON BINDING CAPACITY (ROCHE): 312 UG/DL (ref 240–430)
IRON SATN MFR SERPL: 15 % (ref 15–46)
IRON SERPL-MCNC: 48 UG/DL (ref 37–145)
MCV RBC AUTO: 94.9 FL (ref 78–100)
POTASSIUM SERPL-SCNC: 4.1 MMOL/L (ref 3.4–5.3)
TRANSFERRIN SERPL-MCNC: 232 MG/DL (ref 200–360)

## 2025-09-03 LAB
FERRITIN SERPL-MCNC: 94 NG/ML (ref 11–328)
HGB BLD-MCNC: 10.5 G/DL (ref 11.7–15.7)
IRON BINDING CAPACITY (ROCHE): 320 UG/DL (ref 240–430)
IRON SATN MFR SERPL: 24 % (ref 15–46)
IRON SERPL-MCNC: 76 UG/DL (ref 37–145)
MCV RBC AUTO: 100.3 FL (ref 78–100)
POTASSIUM SERPL-SCNC: 4 MMOL/L (ref 3.4–5.3)
TRANSFERRIN SERPL-MCNC: 239 MG/DL (ref 200–360)